# Patient Record
Sex: MALE | Race: OTHER
[De-identification: names, ages, dates, MRNs, and addresses within clinical notes are randomized per-mention and may not be internally consistent; named-entity substitution may affect disease eponyms.]

---

## 2023-09-13 ENCOUNTER — HOSPITAL ENCOUNTER (OUTPATIENT)
Dept: HOSPITAL 15 - LAB | Age: 23
Discharge: HOME | End: 2023-09-13
Attending: FAMILY MEDICINE
Payer: COMMERCIAL

## 2023-09-13 DIAGNOSIS — E88.81: ICD-10-CM

## 2023-09-13 DIAGNOSIS — E66.09: Primary | ICD-10-CM

## 2023-09-13 DIAGNOSIS — E78.5: ICD-10-CM

## 2023-09-13 DIAGNOSIS — E55.9: ICD-10-CM

## 2023-09-13 LAB
ANION GAP SERPL CALCULATED.3IONS-SCNC: 7.2 MMOL/L (ref 5–15)
BUN SERPL-MCNC: 9 MG/DL (ref 9–23)
BUN/CREAT SERPL: 10.1 (ref 10–20)
CALCIUM SERPL-MCNC: 9.5 MG/DL (ref 8.5–10.1)
CHLORIDE SERPL-SCNC: 107 MMOL/L (ref 98–107)
CHOLEST SERPL-MCNC: 170 MG/DL (ref ?–200)
CO2 SERPL-SCNC: 25.8 MMOL/L (ref 20–30)
GLUCOSE SERPL-MCNC: 97 MG/DL (ref 74–106)
HDLC SERPL-MCNC: 44 MG/DL (ref 40–59)
POTASSIUM SERPL-SCNC: 4 MMOL/L (ref 3.5–5.1)
SODIUM SERPL-SCNC: 140 MMOL/L (ref 136–145)
TRIGL SERPL-MCNC: 129 MG/DL (ref ?–150)

## 2023-09-13 PROCEDURE — 82306 VITAMIN D 25 HYDROXY: CPT

## 2023-09-13 PROCEDURE — 80048 BASIC METABOLIC PNL TOTAL CA: CPT

## 2023-09-13 PROCEDURE — 36415 COLL VENOUS BLD VENIPUNCTURE: CPT

## 2023-09-13 PROCEDURE — 84443 ASSAY THYROID STIM HORMONE: CPT

## 2023-09-13 PROCEDURE — 80061 LIPID PANEL: CPT

## 2023-09-13 PROCEDURE — 83036 HEMOGLOBIN GLYCOSYLATED A1C: CPT

## 2025-01-07 ENCOUNTER — HOSPITAL ENCOUNTER (EMERGENCY)
Dept: HOSPITAL 15 - ER | Age: 25
LOS: 1 days | Discharge: HOME | End: 2025-01-08
Payer: COMMERCIAL

## 2025-01-07 VITALS
HEART RATE: 87 BPM | DIASTOLIC BLOOD PRESSURE: 85 MMHG | TEMPERATURE: 97.4 F | RESPIRATION RATE: 16 BRPM | OXYGEN SATURATION: 97 % | SYSTOLIC BLOOD PRESSURE: 149 MMHG

## 2025-01-07 VITALS — BODY MASS INDEX: 35.61 KG/M2 | HEIGHT: 75 IN | WEIGHT: 286.38 LBS
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
## 2025-01-07 DIAGNOSIS — S82.891A: ICD-10-CM
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
## 2025-01-07 DIAGNOSIS — S92.354A: Primary | ICD-10-CM
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
## 2025-01-07 PROCEDURE — 29515 APPLICATION SHORT LEG SPLINT: CPT

## 2025-01-07 PROCEDURE — 73630 X-RAY EXAM OF FOOT: CPT

## 2025-01-07 PROCEDURE — 99284 EMERGENCY DEPT VISIT MOD MDM: CPT

## 2025-01-07 PROCEDURE — 96372 THER/PROPH/DIAG INJ SC/IM: CPT

## 2025-01-07 PROCEDURE — 73610 X-RAY EXAM OF ANKLE: CPT

## 2025-01-07 NOTE — DVH
EXAMINATIONS:



3 views of the right foot



3 views of the right ankle



CLINICAL HISTORY: Right foot and ankle pain



COMPARISON: None



Findings and impression:



Obliquely oriented and mildly displaced fracture involving the proximal  metadiaphyseal region of the
 5th metatarsal. 



Ankle mortise appears intact.



Electronically Signed by: Triston Benito at 01/07/2025 22:10:37 PM

## 2025-01-08 RX ADMIN — KETOROLAC TROMETHAMINE ONE MG: 60 INJECTION, SOLUTION INTRAMUSCULAR at 01:29

## 2025-01-08 NOTE — ED.PDOC
Musculoskeletal


HPI Comments


24-YEAR-OLD MALE PRESENTS TO ER WITH COMPLAINTS OF RIGHT FOOT PAIN X ONE DAY.  

PATIENT REPORTS THAT HE STARTED EXPERIENCING PAIN TO RIGHT LATERAL FOOT AT 7:30 

P.M. PRIOR TO ARRIVAL TO ER AFTER HE PIVOTED WITH MOST OF HIS WEIGHT ON HIS 

RIGHT FOOT AT WORK.  DENIES ANY TRAUMA/FALLS.  HE RATES HIS CURRENT PAIN A 10/10

TO RIGHT LATERAL FOOT WITH RADIATION TOWARDS RIGHT ANKLE.  DENIES USE OF 

MEDICATIONS FOR CURRENT SYMPTOMS.  STATES HE HAS BEEN UNABLE TO BEAR WEIGHT ON 

RIGHT LEG DUE TO RIGHT LATERAL FOOT PAIN.  DENIES NUMBNESS/TINGLING OR ANY 

FURTHER SYMPTOMS/COMPLAINTS


Chief Complaint:  Lower Extremity


Time Seen by MD:  21:46


Primary Care Provider:  PA Shook Notes:  Nurses Notes, Medications, Allergies


Allergies:  


Coded Allergies:  


     NO KNOWN ALLERGIES (Unverified , 1/30/15)


Home Meds


Active Scripts


Ibuprofen (Ibuprofen) 800 Mg Tab, 1 TAB PO TID PRN, #30 TAB 0 Refills


   Prov:JASMINA HUTCHINS         25


Information Source:  Patient


Mode of Arrival:  Ambulatory





Past Medical History


PAST MEDICAL HISTORY:  Seizures, Thyroid


Surgical History:  Denies all surgeries





Family History


Family History:  Unknown





Social History


Smoker:  Non-Smoker


Alcohol:  Denies ETOH Use


Drugs:  Denies Drug Use


Lives In:  Home





Constitutional:  denies: chills, diaphoresis, fatigue, fever, malaise, sweats, 

weakness, others


EENTM:  denies: blurred vision, double vision, ear bleeding, ear discharge, ear 

drainage, ear pain, ear ringing, eye pain, eye redness, hearing loss, mouth 

pain, mouth swelling, nasal discharge, nose bleeding, nose congestion, nose 

pain, photophobia, tearing, throat pain, throat swelling, voice changes, others


Respiratory:  denies: cough, hemoptysis, orthopnea, SOB at rest, shortness of 

breath, SOB with excertion, stridor, wheezing, others


Cardiovascular:  denies: chest pain, dizzy spells, diaphoresis, Dyspnea on 

exertion, edema, irregular heart beat, left arm pain, lightheadedness, 

palpitations, PND, syncope, others


Gastrointestinal:  denies: abdomen distended, abdominal pain, blood streaked 

bowels, constipated, diarrhea, dysphagia, difficulty swallowing, hematemesis, 

melena, nausea, poor appetite, poor fluid intake, rectal bleeding, rectal pain, 

vomiting, others


Genitourinary:  denies: burning, dysuria, flank pain, frequency, hematuria, 

incontinence, penile discharge, penile sore, pain, testicle pain, testicle 

swelling, urgency, others


Neurological:  denies: dizziness, fainting, headache, left sided numbness, left 

sided weakness, numbness, paresthesia, pre-existing deficit, right sided 

numbness, right sided weakness, seizure, speech problems, tingling, tremors, 

weakness, others


Musculoskeletal:  reports: others (AS STATED IN HPI)


Integumetry:  denies: bruises, change in color, change in hair/nails, dryness, 

laceration, lesions, lumps, rash, wounds, others


Allergic/Immunocompromised:  denies: Difficulty Healing, Frequent Infections, 

Hives, Itching, others


Hematologic/Lymphatic:  denies: anemia, blood clots, easy bleeding, easy 

bruising, swollen glands, others


Endocrine:  denies: excessive hunger, excessive sweating, excessive thirst, 

excessive urination, flushing, intolerance to cold, intolerance to heat, 

unexplained weight gain, unexplained weight loss, others


Psychiatric:  denies: anxiety, bipolar disorder, depression, hopeless, panic 

disorder, schizophrenia, sleepless, suicidal, others





Physical Exam


General Appearance:  No Apparent Distress


HEENT:  PERRL/EOMI


Neck:  Full Range of Motion, Non-Tender, Normal


Respiratory:  Chest Non-Tender, Lungs Clear, No Accessory Muscle Use, No 

Respiratory Distress, Normal Breath Sounds


Cardiovascular:  No Murmur, No Gallop, Regular Rate/Rhythm


Breast Exam:  Deferred


Gastrointestinal:  NOT DONE


Genitalia:  Deferred


Pelvic:  Deferred


Rectal:  Deferred


Extremities:  Normal capillary refill


Musculoskeletal :  


   Extremity Location:  Foot (TTP/MILD SWELLING NOTED TO PROXIMAL RIGHT 5TH 

METATARSAL.  NO TTP TO RIGHT ANKLE NOTED.  NO FURTHER SKIN CHANGES NOTED.  

PULSES INTACT.  PATIENT UNABLE TO BEAR WEIGHT ON RIGHT LEG DUE TO PAIN LOCALIZED

TO PROXIMAL RIGHT 5TH METATARSAL)


Neurologic:  Alert, No Motor Deficits, Normal Affect, Normal Mood, No Sensory 

Deficits


Cerebellar Function:  Normal


Reflexes:  Normal


Skin:  Dry, Normal Color, Warm


Peripheral Pulses:  2+ dorsalis pedis (R), 2+ dorsalis pedis (L)


Lymphatic:  No Adenopathy





Was a procedure done?


Was a procedure done?:  No





Sedation


Sedation?:  No





Differential Diagnosis EXT


Differential Diagnosis:  Dislocation, Laceration, Neurovascular injury





X-Ray, Labs, Meds, VS





                                   Vital Signs








  Date Time  Temp Pulse Resp B/P (MAP) Pulse Ox O2 Delivery O2 Flow Rate FiO2


 


25 21:00 97.4 87 16 149/85 (106) 97   





PATIENT: ARMAND ARZOLACCT: [id number]: [id number]


: 2000           LOC: ER                   ROOM / BED:  / 


AGE / SEX: 24 / M         ADM STATUS: REG ER        SERVICE DT: 25





ORDERING PHYSICIAN: JASMINA HUTCHINS


PROCEDURE(s): RFOOT - R FOOT 3 VIEW XRAY


REASON: RIGHT FOOT PAIN


ORDER NUMBER(s): [id number], ACCESSION NUMBER(s): [id number]








EXAMINATIONS:





3 views of the right foot





3 views of the right ankle





CLINICAL HISTORY: Right foot and ankle pain





COMPARISON: None





Findings and impression:





Obliquely oriented and mildly displaced fracture involving the proximal  

metadiaphyseal region of the 5th metatarsal. 





Ankle mortise appears intact.





Electronically Signed by: Triston Benito at 2025 22:10:37 PM











DICTATED BY: TRISTON BENITO MD


DICTATED DATE/TIME: 25





SIGNED BY: TRISTON BENITO MD


SIGNED DATE/TIME: 25





CC: 





PATIENT: JHOAN ARZOLA   ACCT: [id number]        UNIT: [id number]


: 2000           LOC: ER                   ROOM / BED:  / 


AGE / SEX: 24 / M         ADM STATUS: REG ER        SERVICE DT: 25





ORDERING PHYSICIAN: JASMINA HUTCHINS


PROCEDURE(s): RANKL - R ANKLE 3 VIEW


REASON: RIGHT ANKLE PAIN


ORDER NUMBER(s): [id number], ACCESSION NUMBER(s): [id number]








EXAMINATIONS:





3 views of the right foot





3 views of the right ankle





CLINICAL HISTORY: Right foot and ankle pain





COMPARISON: None





Findings and impression:





Obliquely oriented and mildly displaced fracture involving the proximal  

metadiaphyseal region of the 5th metatarsal. 





Ankle mortise appears intact.





Electronically Signed by: Triston Benito at 2025 22:10:37 PM











DICTATED BY: TRISTON BENITO MD


DICTATED DATE/TIME: 25





SIGNED BY: TRISTON BENITO MD


SIGNED DATE/TIME: 25





CC: 








RIGHT FOOT AND RIGHT ANKLE X-RAY REVIEWED 


RIGHT POSTERIOR ANKLE SPLINT APPLIED 


PATIENT NEUROVASCULARLY INTACT 


TORADOL 60 MG IM ORDERED


CRUTCHES ORDERED, PATIENT EDUCATED ON PROPER USE.  WAS ADVISED ON 

NONWEIGHTBEARING RIGHT LEG 


ADVISED ON REST/ NO STRENUOUS ACTIVITY, ELEVATION AND ALTERNATE ICE ON/OFF AS 

NEEDED FOR PAIN/SWELLING 


PATIENT PROVIDED COPIES OF X-RAY IMAGING REPORTS 


WORKMANS COMP PAPERWORK FILLED OUT


ADVISED TO FOLLOW UP WITH PCP, ULISESS COMP PCP AND ORTHOPEDICS IN 1-2 DAYS 


PATIENT VERBALIZED UNDERSTANDING AND AGREEABLE WITH CURRENT PLAN OF CARE 


ADVISED TO RETURN TO ER IMMEDIATELY IF SYMPTOMS WORSEN


Images Reviewed?:  Images reviewed and evaluated by me


Time of 1ST Reevaluation:  00:02


Reevaluation 1ST:  Improved


Time of 2ND Reevaluation:  00:20


Reevaluation 2ND:  Improved


Patient Education/Counseling:  Diagnosis, Treatment, Prognosis, Need For Follow 

Up


Family Education/Counseling:  No Family Present





Departure 1


Departure


Time of Disposition:  00:22


Impression:  


   Primary Impression:  


   Metatarsal fracture


   Qualified Codes:  S92.354A - Nondisplaced fracture of fifth metatarsal bone, 

   right foot, initial encounter for closed fracture


Disposition:  01 HOME / SELF CARE / HOMELESS


Condition:  Stable


e-Prescriptions


Ibuprofen (Ibuprofen) 800 Mg Tab


1 TAB PO TID PRN, #30 TAB 0 Refills


   Prov: JASMINA HUTCHINS         25


Discharged With:  Friend





Critical Care Note


Critical Care Time?:  No





Stability


Stability form required:  No





Heart Score


Heart Score:  








Heart Score Response (Comments) Value


 


History N/A 0


 


EKG N/A 0


 


Age N/A 0


 


Risk Factors N/A 0


 


Troponin N/A 0


 


Total  0

















JASMINA HUTCHINS                2025 00:27

## 2025-04-29 ENCOUNTER — HOSPITAL ENCOUNTER (OUTPATIENT)
Dept: HOSPITAL 15 - LAB | Age: 25
Discharge: HOME | End: 2025-04-29
Attending: FAMILY MEDICINE
Payer: COMMERCIAL

## 2025-04-29 DIAGNOSIS — E55.9: ICD-10-CM

## 2025-04-29 DIAGNOSIS — G40.909: ICD-10-CM

## 2025-04-29 DIAGNOSIS — F10.20: ICD-10-CM

## 2025-04-29 DIAGNOSIS — E78.5: ICD-10-CM

## 2025-04-29 DIAGNOSIS — E06.3: Primary | ICD-10-CM

## 2025-04-29 DIAGNOSIS — F41.0: ICD-10-CM
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
## 2025-04-29 DIAGNOSIS — E88.819: ICD-10-CM

## 2025-04-29 LAB
ALBUMIN SERPL-MCNC: 4.9 G/DL (ref 3.2–4.8)
ALP SERPL-CCNC: 118 U/L (ref 46–116)
ALT SERPL-CCNC: 42 U/L (ref 7–40)
ANION GAP SERPL CALCULATED.3IONS-SCNC: 10 MMOL/L (ref 5–15)
BILIRUB SERPL-MCNC: 0.4 MG/DL (ref 0.2–1)
BUN SERPL-MCNC: 11 MG/DL (ref 9–23)
BUN/CREAT SERPL: 12.5 (ref 10–20)
CALCIUM SERPL-MCNC: 10.3 MG/DL (ref 8.7–10.4)
CHLORIDE SERPL-SCNC: 107 MMOL/L (ref 98–107)
CHOLEST SERPL-MCNC: 193 MG/DL (ref ?–200)
CO2 SERPL-SCNC: 23 MMOL/L (ref 20–31)
GLUCOSE SERPL-MCNC: 98 MG/DL (ref 74–106)
HCT VFR BLD AUTO: 44.8 % (ref 41–53)
HDLC SERPL-MCNC: 43 MG/DL (ref 40–59)
HGB BLD-MCNC: 15.4 G/DL (ref 13.5–17.5)
MCH RBC QN AUTO: 27.4 PG (ref 28–32)
MCV RBC AUTO: 80.1 FL (ref 80–100)
NRBC BLD QL AUTO: 0.1 %
POTASSIUM SERPL-SCNC: 4.2 MMOL/L (ref 3.5–5.1)
PROT SERPL-MCNC: 8.2 G/DL (ref 5.7–8.2)
SODIUM SERPL-SCNC: 140 MMOL/L (ref 136–145)
TRIGL SERPL-MCNC: 134 MG/DL (ref ?–150)
URATE SERPL-MCNC: 8.2 MG/DL (ref 3.7–9.2)

## 2025-04-29 PROCEDURE — 80061 LIPID PANEL: CPT

## 2025-04-29 PROCEDURE — 81001 URINALYSIS AUTO W/SCOPE: CPT

## 2025-04-29 PROCEDURE — 84443 ASSAY THYROID STIM HORMONE: CPT

## 2025-04-29 PROCEDURE — 84550 ASSAY OF BLOOD/URIC ACID: CPT

## 2025-04-29 PROCEDURE — 85025 COMPLETE CBC W/AUTO DIFF WBC: CPT

## 2025-04-29 PROCEDURE — 36415 COLL VENOUS BLD VENIPUNCTURE: CPT

## 2025-04-29 PROCEDURE — 80053 COMPREHEN METABOLIC PANEL: CPT

## 2025-04-29 PROCEDURE — 82306 VITAMIN D 25 HYDROXY: CPT

## 2025-04-29 PROCEDURE — 83036 HEMOGLOBIN GLYCOSYLATED A1C: CPT
